# Patient Record
Sex: MALE | Race: ASIAN | Employment: OTHER | URBAN - METROPOLITAN AREA
[De-identification: names, ages, dates, MRNs, and addresses within clinical notes are randomized per-mention and may not be internally consistent; named-entity substitution may affect disease eponyms.]

---

## 2018-05-10 ENCOUNTER — HOSPITAL ENCOUNTER (OUTPATIENT)
Dept: PREADMISSION TESTING | Age: 58
Discharge: HOME | End: 2018-05-10
Payer: COMMERCIAL

## 2018-05-10 VITALS — WEIGHT: 160 LBS | BODY MASS INDEX: 24.25 KG/M2 | HEIGHT: 68 IN

## 2018-05-10 LAB
ANION GAP SERPL CALC-SCNC: 9 MMOL/L (ref 3–18)
BASOPHILS # BLD: 0 K/UL (ref 0–0.06)
BASOPHILS NFR BLD: 0 % (ref 0–2)
BUN SERPL-MCNC: 14 MG/DL (ref 7–18)
BUN/CREAT SERPL: 22 (ref 12–20)
CALCIUM SERPL-MCNC: 8.8 MG/DL (ref 8.5–10.1)
CHLORIDE SERPL-SCNC: 104 MMOL/L (ref 100–108)
CO2 SERPL-SCNC: 27 MMOL/L (ref 21–32)
CREAT SERPL-MCNC: 0.65 MG/DL (ref 0.6–1.3)
DIFFERENTIAL METHOD BLD: ABNORMAL
EOSINOPHIL # BLD: 0.1 K/UL (ref 0–0.4)
EOSINOPHIL NFR BLD: 3 % (ref 0–5)
ERYTHROCYTE [DISTWIDTH] IN BLOOD BY AUTOMATED COUNT: 12.4 % (ref 11.6–14.5)
EST. AVERAGE GLUCOSE BLD GHB EST-MCNC: 137 MG/DL
GLUCOSE SERPL-MCNC: 178 MG/DL (ref 74–99)
HBA1C MFR BLD: 6.4 % (ref 4.5–5.6)
HCT VFR BLD AUTO: 39.5 % (ref 36–48)
HGB BLD-MCNC: 13.1 G/DL (ref 13–16)
LYMPHOCYTES # BLD: 1.6 K/UL (ref 0.9–3.6)
LYMPHOCYTES NFR BLD: 29 % (ref 21–52)
MCH RBC QN AUTO: 30.2 PG (ref 24–34)
MCHC RBC AUTO-ENTMCNC: 33.2 G/DL (ref 31–37)
MCV RBC AUTO: 91 FL (ref 74–97)
MONOCYTES # BLD: 0.2 K/UL (ref 0.05–1.2)
MONOCYTES NFR BLD: 4 % (ref 3–10)
NEUTS SEG # BLD: 3.5 K/UL (ref 1.8–8)
NEUTS SEG NFR BLD: 64 % (ref 40–73)
PLATELET # BLD AUTO: 249 K/UL (ref 135–420)
PMV BLD AUTO: 9.3 FL (ref 9.2–11.8)
POTASSIUM SERPL-SCNC: 3.8 MMOL/L (ref 3.5–5.5)
RBC # BLD AUTO: 4.34 M/UL (ref 4.7–5.5)
SODIUM SERPL-SCNC: 140 MMOL/L (ref 136–145)
WBC # BLD AUTO: 5.5 K/UL (ref 4.6–13.2)

## 2018-05-10 PROCEDURE — 80048 BASIC METABOLIC PNL TOTAL CA: CPT | Performed by: UROLOGY

## 2018-05-10 PROCEDURE — 36415 COLL VENOUS BLD VENIPUNCTURE: CPT | Performed by: UROLOGY

## 2018-05-10 PROCEDURE — 83036 HEMOGLOBIN GLYCOSYLATED A1C: CPT | Performed by: UROLOGY

## 2018-05-10 PROCEDURE — 93005 ELECTROCARDIOGRAM TRACING: CPT

## 2018-05-10 PROCEDURE — 85025 COMPLETE CBC W/AUTO DIFF WBC: CPT | Performed by: UROLOGY

## 2018-05-10 RX ORDER — SIMVASTATIN 20 MG/1
20 TABLET, FILM COATED ORAL
COMMUNITY

## 2018-05-10 RX ORDER — TAMSULOSIN HYDROCHLORIDE 0.4 MG/1
0.4 CAPSULE ORAL DAILY
COMMUNITY

## 2018-05-10 RX ORDER — METFORMIN HYDROCHLORIDE 1000 MG/1
1000 TABLET ORAL 2 TIMES DAILY WITH MEALS
COMMUNITY

## 2018-05-10 RX ORDER — CEFAZOLIN SODIUM/WATER 2 G/20 ML
2 SYRINGE (ML) INTRAVENOUS ONCE
Status: CANCELLED | OUTPATIENT
Start: 2018-05-10 | End: 2018-05-10

## 2018-05-10 RX ORDER — SODIUM CHLORIDE, SODIUM LACTATE, POTASSIUM CHLORIDE, CALCIUM CHLORIDE 600; 310; 30; 20 MG/100ML; MG/100ML; MG/100ML; MG/100ML
125 INJECTION, SOLUTION INTRAVENOUS CONTINUOUS
Status: CANCELLED | OUTPATIENT
Start: 2018-05-10

## 2018-05-10 NOTE — PERIOP NOTES
Interpretor #[id number] used for Pat assessment and patient has good understanding that an interpretor is available the day of the surgery as well.

## 2018-05-11 LAB
ATRIAL RATE: 66 BPM
CALCULATED P AXIS, ECG09: 71 DEGREES
CALCULATED R AXIS, ECG10: 29 DEGREES
CALCULATED T AXIS, ECG11: 64 DEGREES
DIAGNOSIS, 93000: NORMAL
P-R INTERVAL, ECG05: 198 MS
Q-T INTERVAL, ECG07: 386 MS
QRS DURATION, ECG06: 92 MS
QTC CALCULATION (BEZET), ECG08: 404 MS
VENTRICULAR RATE, ECG03: 66 BPM

## 2018-05-15 ENCOUNTER — ANESTHESIA EVENT (OUTPATIENT)
Dept: SURGERY | Age: 58
End: 2018-05-15
Payer: COMMERCIAL

## 2018-05-15 ENCOUNTER — APPOINTMENT (OUTPATIENT)
Dept: GENERAL RADIOLOGY | Age: 58
End: 2018-05-15
Attending: UROLOGY
Payer: COMMERCIAL

## 2018-05-15 ENCOUNTER — HOSPITAL ENCOUNTER (OUTPATIENT)
Age: 58
Setting detail: OUTPATIENT SURGERY
Discharge: HOME | End: 2018-05-15
Attending: UROLOGY | Admitting: UROLOGY
Payer: COMMERCIAL

## 2018-05-15 ENCOUNTER — ANESTHESIA (OUTPATIENT)
Dept: SURGERY | Age: 58
End: 2018-05-15
Payer: COMMERCIAL

## 2018-05-15 VITALS
SYSTOLIC BLOOD PRESSURE: 146 MMHG | TEMPERATURE: 97.1 F | WEIGHT: 148.56 LBS | HEIGHT: 68 IN | DIASTOLIC BLOOD PRESSURE: 80 MMHG | OXYGEN SATURATION: 98 % | RESPIRATION RATE: 18 BRPM | BODY MASS INDEX: 22.52 KG/M2 | HEART RATE: 66 BPM

## 2018-05-15 DIAGNOSIS — N20.1 URETERAL STONE: Primary | ICD-10-CM

## 2018-05-15 LAB
GLUCOSE BLD STRIP.AUTO-MCNC: 107 MG/DL (ref 70–110)
GLUCOSE BLD STRIP.AUTO-MCNC: 134 MG/DL (ref 70–110)

## 2018-05-15 PROCEDURE — C1894 INTRO/SHEATH, NON-LASER: HCPCS | Performed by: UROLOGY

## 2018-05-15 PROCEDURE — 74011250637 HC RX REV CODE- 250/637: Performed by: ANESTHESIOLOGY

## 2018-05-15 PROCEDURE — 74011250636 HC RX REV CODE- 250/636: Performed by: ANESTHESIOLOGY

## 2018-05-15 PROCEDURE — 76010000149 HC OR TIME 1 TO 1.5 HR: Performed by: UROLOGY

## 2018-05-15 PROCEDURE — 77030010103 HC SEAL PRT ENDOSC OCOA -B: Performed by: UROLOGY

## 2018-05-15 PROCEDURE — 74011000250 HC RX REV CODE- 250

## 2018-05-15 PROCEDURE — 74011250636 HC RX REV CODE- 250/636

## 2018-05-15 PROCEDURE — 82962 GLUCOSE BLOOD TEST: CPT

## 2018-05-15 PROCEDURE — C1758 CATHETER, URETERAL: HCPCS | Performed by: UROLOGY

## 2018-05-15 PROCEDURE — 77030005526 HC CATH URETH FOL 2W COVD –A: Performed by: UROLOGY

## 2018-05-15 PROCEDURE — C2617 STENT, NON-COR, TEM W/O DEL: HCPCS | Performed by: UROLOGY

## 2018-05-15 PROCEDURE — 74011250636 HC RX REV CODE- 250/636: Performed by: UROLOGY

## 2018-05-15 PROCEDURE — 74420 UROGRAPHY RTRGR +-KUB: CPT

## 2018-05-15 PROCEDURE — 76060000033 HC ANESTHESIA 1 TO 1.5 HR: Performed by: UROLOGY

## 2018-05-15 PROCEDURE — 76210000006 HC OR PH I REC 0.5 TO 1 HR: Performed by: UROLOGY

## 2018-05-15 PROCEDURE — 82360 CALCULUS ASSAY QUANT: CPT | Performed by: UROLOGY

## 2018-05-15 PROCEDURE — 77030006974 HC BSKT URET RTVR BSC -C: Performed by: UROLOGY

## 2018-05-15 PROCEDURE — 77030012508 HC MSK AIRWY LMA AMBU -A: Performed by: ANESTHESIOLOGY

## 2018-05-15 PROCEDURE — 77030014023 HC SYR INFL LVEEN BSC -B: Performed by: UROLOGY

## 2018-05-15 PROCEDURE — 76210000021 HC REC RM PH II 0.5 TO 1 HR: Performed by: UROLOGY

## 2018-05-15 PROCEDURE — 74011636320 HC RX REV CODE- 636/320: Performed by: UROLOGY

## 2018-05-15 PROCEDURE — 77030020782 HC GWN BAIR PAWS FLX 3M -B: Performed by: UROLOGY

## 2018-05-15 PROCEDURE — 74011000272 HC RX REV CODE- 272: Performed by: UROLOGY

## 2018-05-15 PROCEDURE — 77030018836 HC SOL IRR NACL ICUM -A: Performed by: UROLOGY

## 2018-05-15 DEVICE — URETERAL STENT
Type: IMPLANTABLE DEVICE | Site: URETER | Status: FUNCTIONAL
Brand: POLARIS™ ULTRA

## 2018-05-15 RX ORDER — MAGNESIUM SULFATE 100 %
4 CRYSTALS MISCELLANEOUS AS NEEDED
Status: DISCONTINUED | OUTPATIENT
Start: 2018-05-15 | End: 2018-05-15 | Stop reason: HOSPADM

## 2018-05-15 RX ORDER — OXYCODONE HYDROCHLORIDE 5 MG/1
5 TABLET ORAL ONCE
Status: COMPLETED | OUTPATIENT
Start: 2018-05-15 | End: 2018-05-15

## 2018-05-15 RX ORDER — ALBUTEROL SULFATE 0.83 MG/ML
2.5 SOLUTION RESPIRATORY (INHALATION) AS NEEDED
Status: DISCONTINUED | OUTPATIENT
Start: 2018-05-15 | End: 2018-05-15 | Stop reason: HOSPADM

## 2018-05-15 RX ORDER — DEXTROSE 50 % IN WATER (D50W) INTRAVENOUS SYRINGE
25-50 AS NEEDED
Status: DISCONTINUED | OUTPATIENT
Start: 2018-05-15 | End: 2018-05-15 | Stop reason: HOSPADM

## 2018-05-15 RX ORDER — DIPHENHYDRAMINE HYDROCHLORIDE 50 MG/ML
12.5 INJECTION, SOLUTION INTRAMUSCULAR; INTRAVENOUS
Status: DISCONTINUED | OUTPATIENT
Start: 2018-05-15 | End: 2018-05-15 | Stop reason: HOSPADM

## 2018-05-15 RX ORDER — ONDANSETRON 2 MG/ML
INJECTION INTRAMUSCULAR; INTRAVENOUS AS NEEDED
Status: DISCONTINUED | OUTPATIENT
Start: 2018-05-15 | End: 2018-05-15 | Stop reason: HOSPADM

## 2018-05-15 RX ORDER — LIDOCAINE HYDROCHLORIDE 20 MG/ML
INJECTION, SOLUTION EPIDURAL; INFILTRATION; INTRACAUDAL; PERINEURAL AS NEEDED
Status: DISCONTINUED | OUTPATIENT
Start: 2018-05-15 | End: 2018-05-15 | Stop reason: HOSPADM

## 2018-05-15 RX ORDER — SODIUM CHLORIDE, SODIUM LACTATE, POTASSIUM CHLORIDE, CALCIUM CHLORIDE 600; 310; 30; 20 MG/100ML; MG/100ML; MG/100ML; MG/100ML
150 INJECTION, SOLUTION INTRAVENOUS CONTINUOUS
Status: DISCONTINUED | OUTPATIENT
Start: 2018-05-15 | End: 2018-05-15 | Stop reason: HOSPADM

## 2018-05-15 RX ORDER — OXYCODONE AND ACETAMINOPHEN 5; 325 MG/1; MG/1
1 TABLET ORAL
Qty: 25 TAB | Refills: 0 | Status: SHIPPED | OUTPATIENT
Start: 2018-05-15 | End: 2018-06-05

## 2018-05-15 RX ORDER — INSULIN LISPRO 100 [IU]/ML
INJECTION, SOLUTION INTRAVENOUS; SUBCUTANEOUS ONCE
Status: DISCONTINUED | OUTPATIENT
Start: 2018-05-15 | End: 2018-05-15 | Stop reason: HOSPADM

## 2018-05-15 RX ORDER — SODIUM CHLORIDE 0.9 % (FLUSH) 0.9 %
5-10 SYRINGE (ML) INJECTION AS NEEDED
Status: DISCONTINUED | OUTPATIENT
Start: 2018-05-15 | End: 2018-05-15 | Stop reason: HOSPADM

## 2018-05-15 RX ORDER — CEPHALEXIN 500 MG/1
500 CAPSULE ORAL 3 TIMES DAILY
Qty: 15 CAP | Refills: 0 | Status: SHIPPED | OUTPATIENT
Start: 2018-05-15 | End: 2018-05-20

## 2018-05-15 RX ORDER — NALOXONE HYDROCHLORIDE 0.4 MG/ML
0.1 INJECTION, SOLUTION INTRAMUSCULAR; INTRAVENOUS; SUBCUTANEOUS AS NEEDED
Status: DISCONTINUED | OUTPATIENT
Start: 2018-05-15 | End: 2018-05-15 | Stop reason: HOSPADM

## 2018-05-15 RX ORDER — METOCLOPRAMIDE HYDROCHLORIDE 5 MG/ML
INJECTION INTRAMUSCULAR; INTRAVENOUS AS NEEDED
Status: DISCONTINUED | OUTPATIENT
Start: 2018-05-15 | End: 2018-05-15 | Stop reason: HOSPADM

## 2018-05-15 RX ORDER — ONDANSETRON 2 MG/ML
4 INJECTION INTRAMUSCULAR; INTRAVENOUS ONCE
Status: DISCONTINUED | OUTPATIENT
Start: 2018-05-15 | End: 2018-05-15 | Stop reason: HOSPADM

## 2018-05-15 RX ORDER — SODIUM CHLORIDE, SODIUM LACTATE, POTASSIUM CHLORIDE, CALCIUM CHLORIDE 600; 310; 30; 20 MG/100ML; MG/100ML; MG/100ML; MG/100ML
125 INJECTION, SOLUTION INTRAVENOUS CONTINUOUS
Status: DISCONTINUED | OUTPATIENT
Start: 2018-05-15 | End: 2018-05-15 | Stop reason: HOSPADM

## 2018-05-15 RX ORDER — FENTANYL CITRATE 50 UG/ML
INJECTION, SOLUTION INTRAMUSCULAR; INTRAVENOUS AS NEEDED
Status: DISCONTINUED | OUTPATIENT
Start: 2018-05-15 | End: 2018-05-15 | Stop reason: HOSPADM

## 2018-05-15 RX ORDER — CEFAZOLIN SODIUM/WATER 2 G/20 ML
2 SYRINGE (ML) INTRAVENOUS ONCE
Status: COMPLETED | OUTPATIENT
Start: 2018-05-15 | End: 2018-05-15

## 2018-05-15 RX ORDER — FENTANYL CITRATE 50 UG/ML
25 INJECTION, SOLUTION INTRAMUSCULAR; INTRAVENOUS AS NEEDED
Status: DISCONTINUED | OUTPATIENT
Start: 2018-05-15 | End: 2018-05-15 | Stop reason: HOSPADM

## 2018-05-15 RX ORDER — EPHEDRINE SULFATE/0.9% NACL/PF 25 MG/5 ML
SYRINGE (ML) INTRAVENOUS AS NEEDED
Status: DISCONTINUED | OUTPATIENT
Start: 2018-05-15 | End: 2018-05-15 | Stop reason: HOSPADM

## 2018-05-15 RX ORDER — PROPOFOL 10 MG/ML
INJECTION, EMULSION INTRAVENOUS AS NEEDED
Status: DISCONTINUED | OUTPATIENT
Start: 2018-05-15 | End: 2018-05-15 | Stop reason: HOSPADM

## 2018-05-15 RX ORDER — MIDAZOLAM HYDROCHLORIDE 1 MG/ML
INJECTION, SOLUTION INTRAMUSCULAR; INTRAVENOUS AS NEEDED
Status: DISCONTINUED | OUTPATIENT
Start: 2018-05-15 | End: 2018-05-15 | Stop reason: HOSPADM

## 2018-05-15 RX ORDER — HYDROCODONE BITARTRATE AND ACETAMINOPHEN 5; 325 MG/1; MG/1
1 TABLET ORAL AS NEEDED
Status: DISCONTINUED | OUTPATIENT
Start: 2018-05-15 | End: 2018-05-15 | Stop reason: HOSPADM

## 2018-05-15 RX ADMIN — FENTANYL CITRATE 25 MCG: 50 INJECTION, SOLUTION INTRAMUSCULAR; INTRAVENOUS at 13:30

## 2018-05-15 RX ADMIN — FENTANYL CITRATE 25 MCG: 50 INJECTION, SOLUTION INTRAMUSCULAR; INTRAVENOUS at 12:38

## 2018-05-15 RX ADMIN — ONDANSETRON 4 MG: 2 INJECTION INTRAMUSCULAR; INTRAVENOUS at 12:34

## 2018-05-15 RX ADMIN — METOCLOPRAMIDE HYDROCHLORIDE 10 MG: 5 INJECTION INTRAMUSCULAR; INTRAVENOUS at 12:34

## 2018-05-15 RX ADMIN — MIDAZOLAM HYDROCHLORIDE 2 MG: 1 INJECTION, SOLUTION INTRAMUSCULAR; INTRAVENOUS at 12:25

## 2018-05-15 RX ADMIN — PROPOFOL 200 MG: 10 INJECTION, EMULSION INTRAVENOUS at 12:29

## 2018-05-15 RX ADMIN — SODIUM CHLORIDE, SODIUM LACTATE, POTASSIUM CHLORIDE, AND CALCIUM CHLORIDE: 600; 310; 30; 20 INJECTION, SOLUTION INTRAVENOUS at 12:29

## 2018-05-15 RX ADMIN — SODIUM CHLORIDE, SODIUM LACTATE, POTASSIUM CHLORIDE, AND CALCIUM CHLORIDE: 600; 310; 30; 20 INJECTION, SOLUTION INTRAVENOUS at 13:39

## 2018-05-15 RX ADMIN — FENTANYL CITRATE 50 MCG: 50 INJECTION, SOLUTION INTRAMUSCULAR; INTRAVENOUS at 12:59

## 2018-05-15 RX ADMIN — SODIUM CHLORIDE, SODIUM LACTATE, POTASSIUM CHLORIDE, AND CALCIUM CHLORIDE 125 ML/HR: 600; 310; 30; 20 INJECTION, SOLUTION INTRAVENOUS at 10:16

## 2018-05-15 RX ADMIN — Medication 2 G: at 12:29

## 2018-05-15 RX ADMIN — FENTANYL CITRATE 25 MCG: 50 INJECTION, SOLUTION INTRAMUSCULAR; INTRAVENOUS at 14:26

## 2018-05-15 RX ADMIN — Medication 10 MG: at 12:54

## 2018-05-15 RX ADMIN — OXYCODONE HYDROCHLORIDE 5 MG: 5 TABLET ORAL at 15:39

## 2018-05-15 RX ADMIN — FENTANYL CITRATE 25 MCG: 50 INJECTION, SOLUTION INTRAMUSCULAR; INTRAVENOUS at 14:36

## 2018-05-15 RX ADMIN — LIDOCAINE HYDROCHLORIDE 100 MG: 20 INJECTION, SOLUTION EPIDURAL; INFILTRATION; INTRACAUDAL; PERINEURAL at 12:29

## 2018-05-15 NOTE — INTERVAL H&P NOTE
H&P Update: Rolanda Mobley was seen and examined. History and physical has been reviewed. The patient has been examined.  There have been no significant clinical changes since the completion of the originally dated History and Physical.    Signed By: Marcie Alvarado MD     May 15, 2018 12:23 PM

## 2018-05-15 NOTE — ANESTHESIA PREPROCEDURE EVALUATION
Anesthetic History   No history of anesthetic complications            Review of Systems / Medical History  Patient summary reviewed, nursing notes reviewed and pertinent labs reviewed    Pulmonary  Within defined limits                 Neuro/Psych   Within defined limits           Cardiovascular                  Exercise tolerance: >4 METS     GI/Hepatic/Renal  Within defined limits              Endo/Other    Diabetes: well controlled         Other Findings              Physical Exam    Airway  Mallampati: II  TM Distance: 4 - 6 cm  Neck ROM: normal range of motion, short neck   Mouth opening: Normal     Cardiovascular  Regular rate and rhythm,  S1 and S2 normal,  no murmur, click, rub, or gallop             Dental  No notable dental hx       Pulmonary  Breath sounds clear to auscultation               Abdominal  GI exam deferred       Other Findings            Anesthetic Plan    ASA: 2  Anesthesia type: general          Induction: Intravenous  Anesthetic plan and risks discussed with: Patient

## 2018-05-15 NOTE — ANESTHESIA POSTPROCEDURE EVALUATION
Post-Anesthesia Evaluation and Assessment    Patient: Shubham Marcus MRN: [id number]  SSN: [id number]    YOB: 1960  Age: 59 y.o. Sex: male       Cardiovascular Function/Vital Signs  Visit Vitals    /79    Pulse 66    Temp 37.5 °C (99.5 °F)    Resp 19    Ht 5' 8\" (1.727 m)    Wt 67.4 kg (148 lb 9 oz)    SpO2 99%    BMI 22.59 kg/m2       Patient is status post general anesthesia for Procedure(s):  CYSTOSCOPY, RIGHT RETROGRADE PYELOGRAM WITH INTERPRETATION, RIGHT URETEROSCOPY, LASER LITHOTRIPSY W/HOLMIUM, STENT PLACEMENT W/C-ARM. Nausea/Vomiting: None    Postoperative hydration reviewed and adequate. Pain:  Pain Scale 1: Visual (05/15/18 1435)  Pain Intensity 1: 1 (05/15/18 1435)   Managed    Neurological Status:   Neuro (WDL): Within Defined Limits (05/15/18 1435)  Neuro  Neurologic State: Drowsy (05/15/18 1435)  LUE Motor Response: Purposeful (05/15/18 1355)  LLE Motor Response: Purposeful (05/15/18 1355)  RUE Motor Response: Purposeful (05/15/18 1355)  RLE Motor Response: Purposeful (05/15/18 1355)   At baseline    Mental Status and Level of Consciousness: Arousable    Pulmonary Status:   O2 Device: Room air (05/15/18 1435)   Adequate oxygenation and airway patent    Complications related to anesthesia: None    Post-anesthesia assessment completed.  No concerns      Signed By: Kasey Carvajal CRNA     May 15, 2018

## 2018-05-15 NOTE — H&P
A    Urology [address], [facility] [employer], [address] [place] [address]  phone: [phone]  fax: [phone]          Patient: Layton Mejias  YOB: 1960  Date: 05/04/2018 9:30 AM   Visit Type: Office Visit      Assessment/Plan  # Detail Type Description    1. Assessment Gross hematuria (R31.0). Patient Plan His hematuria is undoubtedly due to his stone. Obviously I will eval with cysto at the time of his procedure. 2. Assessment Ureteric stone (N20.1). Patient Plan He has a right ureteral stone. It's large and he has a hx of dense calcium oxalate monohydrate stones. We will do a left ureteroscopy b/c ESWL is less effective for the dense stones. Risk of bleeding, infection, injury and possible need for more than 1 procedure were discussed. He was told he would need a stent afterwards undoubtedly for at least 1wk. He will proceed. This 59year old  patient was referred by Reese Sears. This 59year old male presents for Kidney and/or Ureteral Stone. History of Present Illness:  1. Kidney and/or Ureteral Stone      Onset was 1 week ago. Severity level is mild. It occurs intermittently. The problem is improving. The pain does not radiate. Prior stone type of calcium oxalate monohydrate. There are no aggravating factors. The patient lists no relieving factors. Associated symptoms include hematuria (gross) and pain. Pertinent negatives include chills, constipation, diarrhea, fever, nausea, pelvic pain and vomiting. Additional information: 5/1/18 KUB: 4x11 stone at right UPJ    5/4/18 -- He currently feels well. Serena Rodriguez PAST MEDICAL/SURGICAL HISTORY   (Reviewed, updated)      DIAGNOSTICS HISTORY:  Test Ordered Interpretation Result completed   EKG 08/05/2014 See Result Normal sinus rhythm. No abnormal findings noted. Rhythm: sinus.  Rate: 61. AV Conduction: normal. QRS Axis and Voltage: normal. Chamber Hypertrophy/Enlargement: normal. ST/T wave abnormality: normal. 2014   *EYE EXAM & TREATMENT  No DM retinopathy  2018     Test Ordered Ordering Comments Modifier   EKG 2014     *EYE EXAM & TREATMENT          PROBLEM LIST:  Problem Description Onset Date   Pinguecula 2012   Disorder of refraction AND/OR accommodation 2012   Hematuria, gross    Type II diabetes mellitus w/o complication    Pure hypercholesterolemia 2012   Hypercholesterolemia 2015   Type 2 diabetes mellitus well controlled 2015     Medication Reconciliation  Medications reconciled today. Medication Reviewed  Adherence Medication Name Sig Desc Elsewhere Status   taking as directed Flomax 0.4 mg capsule take 1 capsule by oral route  every day 1/2 hour following the same meal each day N Verified   taking as directed metformin  mg tablet,extended release 24hr take 2 tablet by oral route 2 times every day with the morning and evening meal N Verified   taking as directed Zocor 20 mg tablet take 1 tablet by oral route  every day in the evening N Verified     Allergies  Ingredient Reaction Medication Name Comment   NO KNOWN ALLERGIES        (Reviewed, no changes.)  Family History:  (Reviewed, updated)  Relationship Family Member Name  Age at Death Condition Onset Age Cause of Death   Brother    Diabetes mellitus type 2 58 N   Mother    DM, Chol  N   Sister    DM 59 N         Social History:  (Reviewed, updated)  Tobacco use reviewed. The patient is right-handed. Preferred language is Naqvi. EDUCATION/EMPLOYMENT/OCCUPATION  Employment History Status Retired Restrictions   [employer]        MARITAL STATUS/FAMILY/SOCIAL SUPPORT  Currently . CHILDREN  Has children:  2 son(s). Tobacco use status: Never smoked tobacco.  Smoking status: Never smoker.     SMOKING STATUS  Use Status Type Smoking Status Usage Per Day Years Used Total Pack Years   no/never  Never smoker          No passive smoke exposure. ALCOHOL  There is a history of alcohol use. Type: Driss Guevara. 4 drinks consumed weekly. Last alcoholic drink was last week. CAFFEINE  The patient uses caffeine: coffee. LIFESTYLE  Moderate activity level. Exercises daily. DIET  , high cholesterol. SLEEP PATTERNS  Patient has no changes to sleep patterns. HOME ENVIRONMENT/SAFETY  The home has smoke detectors. Uses seat belts. Review of Systems  System Neg/Pos Details   Constitutional Negative Chills and fever. ENMT Negative Ear infections and sore throat. Eyes Negative Blurred vision, double vision and eye pain. Respiratory Negative Asthma, chronic cough, dyspnea and wheezing. Cardio Negative Chest pain. GI Negative Constipation, decreased appetite, diarrhea, nausea and vomiting.  Positive Hematuria. Endocrine Negative Cold intolerance, heat intolerance, increased thirst and weight loss. Neuro Negative Headache and tremors. Psych Negative Anxiety and depression. Integumentary Negative Itching skin and rash. MS Negative Back pain and joint pain. Hema/Lymph Negative Easy bleeding. Reproductive Negative Sexual dysfunction. Physical Exam  Exam Findings Details   Constitutional Normal Well developed. Neck Exam Normal Inspection - Normal.   Respiratory Normal Inspection - Normal.   Abdomen Normal No abdominal tenderness. Genitourinary Normal No CVA tenderness. No suprapubic tenderness. Extremity Normal No edema. Psychiatric Normal Oriented to time, place, person and situation. Appropriate mood and affect.          Medications (added, continued, or stopped today)  Started Medication Directions PRN Status PRN Reason Instruction Stopped   11/03/2017 Flomax 0.4 mg capsule take 1 capsule by oral route  every day 1/2 hour following the same meal each day N      04/12/2018 metformin  mg tablet,extended release 24hr take 2 tablet by oral route 2 times every day with the morning and evening meal N      04/12/2018 Zocor 20 mg tablet take 1 tablet by oral route  every day in the evening N      Completed by:            June Wild MD

## 2018-05-15 NOTE — DISCHARGE INSTRUCTIONS
Drink plenty of fluids to keep the urine clear  Regular diet  Take prescriptions as directed  Shower tomorrow  Ambulate multiple times daily  Dr. Saniya Brito office will call with follow up appointment  Contact Dr. Saniya Brito office with any Post op questions or concerns at [address] from Nurse    PATIENT INSTRUCTIONS:    After general anesthesia or intravenous sedation, for 24 hours or while taking prescription Narcotics:  · Limit your activities  · Do not drive and operate hazardous machinery  · Do not make important personal or business decisions  · Do  not drink alcoholic beverages  · If you have not urinated within 8 hours after discharge, please contact your surgeon on call. Report the following to your surgeon:  · Excessive pain, swelling, redness or odor of or around the surgical area  · Temperature over 100.5  · Nausea and vomiting lasting longer than 4 hours or if unable to take medications  · Any signs of decreased circulation or nerve impairment to extremity: change in color, persistent  numbness, tingling, coldness or increase pain  · Any questions    What to do at Home:  Recommended activity: Ambulate in house and No driving while on analgesics,     If you experience any of the following symptoms fever, chills, uncontrollable pain, active bleeding- thick urine with clots, difficulty urinating, please follow up with Dr. Kate Gunter. *  Please give a list of your current medications to your Primary Care Provider. *  Please update this list whenever your medications are discontinued, doses are      changed, or new medications (including over-the-counter products) are added. *  Please carry medication information at all times in case of emergency situations.     These are general instructions for a healthy lifestyle:    No smoking/ No tobacco products/ Avoid exposure to second hand smoke  Surgeon General's Warning:  Quitting smoking now greatly reduces serious risk to your health. Obesity, smoking, and sedentary lifestyle greatly increases your risk for illness    A healthy diet, regular physical exercise & weight monitoring are important for maintaining a healthy lifestyle    You may be retaining fluid if you have a history of heart failure or if you experience any of the following symptoms:  Weight gain of 3 pounds or more overnight or 5 pounds in a week, increased swelling in our hands or feet or shortness of breath while lying flat in bed. Please call your doctor as soon as you notice any of these symptoms; do not wait until your next office visit. Recognize signs and symptoms of STROKE:    F-face looks uneven    A-arms unable to move or move unevenly    S-speech slurred or non-existent    T-time-call 911 as soon as signs and symptoms begin-DO NOT go       Back to bed or wait to see if you get better-TIME IS BRAIN. Warning Signs of HEART ATTACK     Call 911 if you have these symptoms:   Chest discomfort. Most heart attacks involve discomfort in the center of the chest that lasts more than a few minutes, or that goes away and comes back. It can feel like uncomfortable pressure, squeezing, fullness, or pain.  Discomfort in other areas of the upper body. Symptoms can include pain or discomfort in one or both arms, the back, neck, jaw, or stomach.  Shortness of breath with or without chest discomfort.  Other signs may include breaking out in a cold sweat, nausea, or lightheadedness. Don't wait more than five minutes to call 911 - MINUTES MATTER! Fast action can save your life. Calling 911 is almost always the fastest way to get lifesaving treatment. Emergency Medical Services staff can begin treatment when they arrive -- up to an hour sooner than if someone gets to the hospital by car. Patient armband removed and shredded    The discharge information has been reviewed with the patient and caregiver. The patient and caregiver verbalized understanding.   Discharge medications reviewed with the patient and caregiver and appropriate educational materials and side effects teaching were provided.   ___________________________________________________________________________________________________________________________________

## 2018-05-15 NOTE — PERIOP NOTES
TRANSFER - OUT REPORT:    Verbal report given to CARISA Bourne (name) on Cadence Kelly  being transferred to phase 2(unit) for routine post - op       Report consisted of patients Situation, Background, Assessment and   Recommendations(SBAR). Information from the following report(s) SBAR, Intake/Output and MAR was reviewed with the receiving nurse. Lines:   Peripheral IV 05/15/18 Left Hand (Active)   Site Assessment Clean, dry, & intact 5/15/2018  2:18 PM   Phlebitis Assessment 0 5/15/2018  2:18 PM   Infiltration Assessment 0 5/15/2018  2:18 PM   Dressing Status Clean, dry, & intact 5/15/2018  2:18 PM   Dressing Type Transparent;Tape 5/15/2018  2:18 PM   Hub Color/Line Status Infusing 5/15/2018  2:18 PM        Opportunity for questions and clarification was provided.       Patient transported with: CNA

## 2018-05-15 NOTE — IP AVS SNAPSHOT
[address] [address] 
[phone] Patient: Betina Moody MRN: [id number] ELJ:60/2/1903 About your hospitalization You were admitted on:  May 15, 2018 You last received care in the:  [facility] PACU You were discharged on:  May 15, 2018 Why you were hospitalized Your primary diagnosis was:  Ureteral Stone Follow-up Information Follow up With Details Comments Contact Info Janice Damon. [phone]  [address] [facility] 
[phone] Ailyn Fraser MD  office will call with follow up appointment [address] [facility] [address] [facility] 
[phone] Discharge Orders None A check christina indicates which time of day the medication should be taken. My Medications START taking these medications Instructions Each Dose to Equal  
 Morning Noon Evening Bedtime  
 cephALEXin 500 mg capsule Commonly known as:  Earmon Gravely Your last dose was: Your next dose is: Take 1 Cap by mouth three (3) times daily for 5 days. 500 mg  
    
   
   
   
  
 oxyCODONE-acetaminophen 5-325 mg per tablet Commonly known as:  PERCOCET Your last dose was: Your next dose is: Take 1 Tab by mouth every four (4) hours as needed for Pain. Max Daily Amount: 6 Tabs. 1 Tab CONTINUE taking these medications Instructions Each Dose to Equal  
 Morning Noon Evening Bedtime  
 metFORMIN 1,000 mg tablet Commonly known as:  GLUCOPHAGE Your last dose was: Your next dose is: Take 1,000 mg by mouth two (2) times daily (with meals). 1000 mg  
    
   
   
   
  
 simvastatin 20 mg tablet Commonly known as:  ZOCOR Your last dose was: Your next dose is: Take 20 mg by mouth nightly. 20 mg  
    
   
   
   
  
 tamsulosin 0.4 mg capsule Commonly known as:  FLOMAX Your last dose was: Your next dose is: Take 0.4 mg by mouth daily. 0.4 mg Where to Get Your Medications Information on where to get these meds will be given to you by the nurse or doctor. ! Ask your nurse or doctor about these medications  
  cephALEXin 500 mg capsule  
 oxyCODONE-acetaminophen 5-325 mg per tablet Opioid Education Prescription Opioids: What You Need to Know: 
 
 
 
F-face looks uneven A-arms unable to move or move unevenly S-speech slurred or non-existent T-time-call 911 as soon as signs and symptoms begin-DO NOT go Back to bed or wait to see if you get better-TIME IS BRAIN. Warning Signs of HEART ATTACK Call 911 if you have these symptoms: 
? Chest discomfort. Most heart attacks involve discomfort in the center of the chest that lasts more than a few minutes, or that goes away and comes back. It can feel like uncomfortable pressure, squeezing, fullness, or pain. ? Discomfort in other areas of the upper body.  Symptoms can include pain or discomfort in one or both arms, the back, neck, jaw, or stomach. ? Shortness of breath with or without chest discomfort. ? Other signs may include breaking out in a cold sweat, nausea, or lightheadedness. Don't wait more than five minutes to call [address]! Fast action can save your life. Calling 911 is almost always the fastest way to get lifesaving treatment. Emergency Medical Services staff can begin treatment when they arrive  up to an hour sooner than if someone gets to the hospital by car. Patient armband removed and shredded The discharge information has been reviewed with the patient and caregiver. The patient and caregiver verbalized understanding. Discharge medications reviewed with the patient and caregiver and appropriate educational materials and side effects teaching were provided. ___________________________________________________________________________________________________________________________________ Introducing \[facility] [place] [facility]\"" & HEALTH SERVICES! [facility] introduces [employer] patient portal. Now you can access parts of your medical record, email your doctor's office, and request medication refills online. 1. In your internet browser, go to [email or web address] 2. Click on the First Time User? Click Here link in the Sign In box. You will see the New Member Sign Up page. 3. Enter your [employer] Access Code exactly as it appears below. You will not need to use this code after youve completed the sign-up process. If you do not sign up before the expiration date, you must request a new code. · [employer] Access Code: [email or web address] Expires: 8/6/2018 12:33 PM 
 
4. Enter the last four digits of your Social Security Number (xxxx) and Date of Birth (mm/dd/yyyy) as indicated and click Submit. You will be taken to the next sign-up page. 5. Create a MyChart ID.  This will be your [employer] login ID and cannot be changed, so think of one that is secure and easy to remember. 6. Create a [employer] password. You can change your password at any time. 7. Enter your Password Reset Question and Answer. This can be used at a later time if you forget your password. 8. Enter your e-mail address. You will receive e-mail notification when new information is available in [address] 9. Click Sign Up. You can now view and download portions of your medical record. 10. Click the Download Summary menu link to download a portable copy of your medical information. If you have questions, please visit the Frequently Asked Questions section of the [employer] website. Remember, [employer] is NOT to be used for urgent needs. For medical emergencies, dial 911. Now available from your iPhone and Android! Introducing Vishal Arevalo As a [facility] Reusing patient, I wanted to make you aware of our electronic visit tool called Vishal Arevalo. [facility] Reusing 24/7 allows you to connect within minutes with a medical provider 24 hours a day, seven days a week via a mobile device or tablet or logging into a secure website from your computer. You can access Vishal Arevalo from anywhere in the [place]. A virtual visit might be right for you when you have a simple condition and feel like you just dont want to get out of bed, or cant get away from work for an appointment, when your regular [facility] Reusing provider is not available (evenings, weekends or holidays), or when youre out of town and need minor care. Electronic visits cost only $49 and if the [facility]using 24/7 provider determines a prescription is needed to treat your condition, one can be electronically transmitted to a nearby pharmacy*. Please take a moment to enroll today if you have not already done so. The enrollment process is free and takes just a few minutes.   To enroll, please download the [facility] Reusing 24/7 elizabeth to your tablet or phone, or visit [email or web address] to enroll on your computer. And, as an [address] [place] [address] patient with a [employer] account, the results of your visits will be scanned into your electronic medical record and your primary care provider will be able to view the scanned results. We urge you to continue to see your regular Diane Robledo provider for your ongoing medical care. And while your primary care provider may not be the one available when you seek a Vishal Doculynxsamirfin virtual visit, the peace of mind you get from getting a real diagnosis real time can be priceless. For more information on [employer], view our Frequently Asked Questions (FAQs) at [email or web address]. Sincerely, 
 
Surekha Ramirez MD 
Chief Medical Officer [address] *:  certain medications cannot be prescribed via [employer] Unresulted Labs-Please follow up with your PCP about these lab tests Order Current Status XR RETROGRADE PYELOGRAM In process Providers Seen During Your Hospitalization Provider Specialty Primary office phone Evgeny Diaz MD Urology [phone] Your Primary Care Physician (PCP) Primary Care Physician Office Phone Office Fax Colette Crow [phone] You are allergic to the following Allergen Reactions Other Food Hives Canned meat Recent Documentation Height Weight BMI Smoking Status 1.727 m 67.4 kg 22.59 kg/m2 Former Smoker Emergency Contacts Name Discharge Info Relation Home Work Mobile [facility] DISCHARGE CAREGIVER [3] Spouse [3]   [phone] Kyung Tellez N/A  AT THIS TIME [6] Other Relative [6]   [phone] [facility] DISCHARGE CAREGIVER [3] Son [22]   [phone] Patient Belongings  The following personal items are in your possession at time of discharge: 
  Dental Appliances: None         Home Medications: None   Jewelry: None Clothing: Pants, Shirt, Socks, Footwear, Undergarments (with Alanis)    Other Valuables: Eyeglasses Please provide this summary of care documentation to your next provider. Signatures-by signing, you are acknowledging that this After Visit Summary has been reviewed with you and you have received a copy. Patient Signature:  ____________________________________________________________ Date:  ____________________________________________________________  
  
Cassidy Johnson Provider Signature:  ____________________________________________________________ Date:  ____________________________________________________________

## 2018-05-15 NOTE — BRIEF OP NOTE
BRIEF OPERATIVE NOTE    Date of Procedure: 5/15/2018   Preoperative Diagnosis: URETERAL STONE  Postoperative Diagnosis: URETERAL STONE with hydro and kidney stone    Procedure(s):  CYSTOSCOPY, RIGHT RETROGRADE PYELOGRAM WITH INTERPRETATION, RIGHT URETEROSCOPY, LASER LITHOTRIPSY of URETERAL STONE.  LASER LITHOTRIPSY OF RENAL STONE W/HOLMIUM, STENT PLACEMENT   Surgeon(s) and Role:     * Olivia Hurley MD - Primary         Surgical Assistant: NONE    Surgical Staff:  Circ-1: Joann Waters  Scrub Tech-1: Fina Granados  Scrub Tech-2: Charles Cash  Event Time In   Incision Start 1243   Incision Close 1344     Anesthesia: General   Estimated Blood Loss: NONE  Specimens:   ID Type Source Tests Collected by Time Destination   1 : right kidney stones Other URETER, RIGHT  Olivia Hurley MD 5/15/2018 1317 Pathology      Findings: LARGE STONE IN MID URETER AND SMALL STONE IN MIDPOLEOF KIDNEY LASERED AND LARGEST FRAGMENTS REMOVED   Complications: NONE  Implants:   Implant Name Type Inv.  Item Serial No.  Lot No. LRB No. Used Action   STENT Charlie Veronica 5X24 -- [facility] - [id number]   Sherri BRICE 5X24 -- [facility] 19 [id number] Right 1 Implanted

## 2018-05-15 NOTE — PERIOP NOTES
Discharge instructions completed - translation via Spiritism member - verbalizes understanding - opportunity for questions

## 2018-05-25 LAB
CA PHOS MFR STONE: 3 %
CALCIUM OXALATE DIHYDRATE MFR STONE IR: 5 %
COLOR STONE: NORMAL
COM MFR STONE: 92 %
COMMENT, 519994: NORMAL
COMPOSITION, 120440: NORMAL
DISCLAIMER, STO32L: NORMAL
NIDUS STONE QL: NORMAL
SIZE STONE: NORMAL MM
WT STONE: 50.6 MG

## 2018-05-30 NOTE — OP NOTES
[address] REPORT    Katalina Mcdermott  MR#: [id number]  : 1960  ACCOUNT #: [de-identified]   DATE OF SERVICE: 05/15/2018    PREOPERATIVE DIAGNOSIS:  Ureteral stone. POSTOPERATIVE DIAGNOSES:  Ureteral stone with hydronephrosis and a kidney stone. PROCEDURES PERFORMED:  Cystoscopy, right retrograde pyelogram with interpretation, right ureteroscopy, laser lithotripsy of ureteral stone, laser lithotripsy of renal stone with holmium laser and stent placement. SURGEON:    Tara Serra MD    ASSISTANT:  None. ANESTHESIA:  General.    ESTIMATED BLOOD LOSS:  None. SPECIMENS REMOVED:  Kidney stone. FINDINGS:  The patient had a large stone in the mid ureter and a small stone in the mid pole of the kidney. Everything was lasered and the largest fragments removed. COMPLICATIONS:  None. IMPLANT:  A 5-Frisian 24 cm stent. CLINICAL NOTE:  The patient is a 77-year-old gentleman with a history of stones. He has had one month of intermittent right flank pain and presents for ureteroscopy. Preoperatively, risks and benefits of surgery were described to the patient, with the risks include but are not limited to bleeding, infection, injury to the bladder, injury to ureter, injury to the kidney, possible need for more than 1 procedure to remain stone free. The patient understood the risks and signed the informed consent. PROCEDURE:  The patient was taken to the operating room and placed on OR table in supine position. He was administered general anesthetic. He was administered intravenous antibiotics. He was placed in dorsal lithotomy position, prepped and draped in the usual sterile manner. A 21-Frisian cystoscope and sheath was then inserted transurethrally, atraumatically, under direct vision using a 30 degree lens. Panendoscopy was done. The bilateral ureteral orifices were in normal anatomic position. There were no stones, no tumors, no areas of concern.   The anterior urethra was normal.  The prostatic urethra showed mild enlargement. The right ureteral orifice was identified. Cannulated with a 5 Western Tracy open-ended ureteral catheter. Retrograde pyelogram was done in the usual manner, using 50 mL of Visipaque  dye. There was noted to be a filling defect in the proximal ureter. Proximal to this, there was hydroureteronephrosis. There was no blunting of calices. No other filling defects were seen along the length of the ureter, up into the kidney. Next, I passed a sensor wire through the open-ended catheter up to the kidney. The open-ended catheter was removed. I then removed the scope and the open-ended catheter. I then placed a dual lumen catheter, past a second sensor wire up to the kidney. The dual lumen catheter was removed. One wire was a safety wire and the other wire was a working wire. Over the working wire, I passed an 11/13/28 cm Navigator sheath over the wire, up into the mid ureter. The inner workings of the sheath and the working wire were removed. I then passed a flexible ureteroscope through the sheath, up the ureter. We encountered the stone in the proximal ureter, used a laser it to completion using a 200 micron holmium laser fiber. All the largest fragments were removed with a Zero Tip basket. I then advanced the scope up into the kidney. Pan pyeloscopy was done. There was noted to be a stone in the mid pole markel, that was lasered to completion and all fragments removed. There were no significant fragments by the end of the case, there were no other stones, no areas of concern, no tumors. Next, I removed the scope and the sheath without difficulty. As I slowly backed the scope down the ureter, it was noted that the ureter was intact and unharmed. Next, I reinserted the cystoscope. Over the safety wire, I passed a 5 Tajik 24 cm stent. The wire was removed. Fluoroscopy confirmed the proximal curl in the kidney.   The distal curl was noted to be in the bladder by direct vision. At this point, the patient taken out of lithotomy position, revived from anesthesia, and taken to recovery in stable condition.       Aundrea Villarreal MD       [facility] / Matheus Garcia  D: 05/30/2018 07:43     T: 05/30/2018 08:30  JOB #: [id number]

## 2018-06-05 ENCOUNTER — HOSPITAL ENCOUNTER (OUTPATIENT)
Dept: PREADMISSION TESTING | Age: 58
Discharge: HOME | End: 2018-06-05
Payer: COMMERCIAL

## 2018-06-05 VITALS — BODY MASS INDEX: 22.88 KG/M2 | WEIGHT: 151 LBS | HEIGHT: 68 IN

## 2018-06-05 LAB
ANION GAP SERPL CALC-SCNC: 8 MMOL/L (ref 3–18)
BUN SERPL-MCNC: 10 MG/DL (ref 7–18)
BUN/CREAT SERPL: 16 (ref 12–20)
CALCIUM SERPL-MCNC: 9 MG/DL (ref 8.5–10.1)
CHLORIDE SERPL-SCNC: 104 MMOL/L (ref 100–108)
CO2 SERPL-SCNC: 29 MMOL/L (ref 21–32)
CREAT SERPL-MCNC: 0.61 MG/DL (ref 0.6–1.3)
GLUCOSE SERPL-MCNC: 107 MG/DL (ref 74–99)
HBA1C MFR BLD: 6.4 % (ref 4.5–5.6)
HCT VFR BLD AUTO: 41.7 % (ref 36–48)
HGB BLD-MCNC: 13.7 G/DL (ref 13–16)
POTASSIUM SERPL-SCNC: 4.2 MMOL/L (ref 3.5–5.5)
SODIUM SERPL-SCNC: 141 MMOL/L (ref 136–145)

## 2018-06-05 PROCEDURE — 80048 BASIC METABOLIC PNL TOTAL CA: CPT | Performed by: UROLOGY

## 2018-06-05 PROCEDURE — 85018 HEMOGLOBIN: CPT | Performed by: UROLOGY

## 2018-06-05 PROCEDURE — 83036 HEMOGLOBIN GLYCOSYLATED A1C: CPT | Performed by: UROLOGY

## 2018-06-05 RX ORDER — SODIUM CHLORIDE, SODIUM LACTATE, POTASSIUM CHLORIDE, CALCIUM CHLORIDE 600; 310; 30; 20 MG/100ML; MG/100ML; MG/100ML; MG/100ML
125 INJECTION, SOLUTION INTRAVENOUS CONTINUOUS
Status: CANCELLED | OUTPATIENT
Start: 2018-06-05

## 2018-06-05 NOTE — PERIOP NOTES
Fasting blood sugar on admit denies sleep apnea or complications with anesthesia tramaine prep reviewed

## 2018-06-18 ENCOUNTER — ANESTHESIA EVENT (OUTPATIENT)
Dept: SURGERY | Age: 58
End: 2018-06-18
Payer: COMMERCIAL

## 2018-06-18 RX ORDER — FENTANYL CITRATE 50 UG/ML
50 INJECTION, SOLUTION INTRAMUSCULAR; INTRAVENOUS
Status: CANCELLED | OUTPATIENT
Start: 2018-06-18

## 2018-06-18 RX ORDER — INSULIN LISPRO 100 [IU]/ML
INJECTION, SOLUTION INTRAVENOUS; SUBCUTANEOUS ONCE
Status: CANCELLED | OUTPATIENT
Start: 2018-06-18 | End: 2018-06-19

## 2018-06-18 RX ORDER — MAGNESIUM SULFATE 100 %
4 CRYSTALS MISCELLANEOUS AS NEEDED
Status: CANCELLED | OUTPATIENT
Start: 2018-06-18

## 2018-06-18 RX ORDER — DEXTROSE 50 % IN WATER (D50W) INTRAVENOUS SYRINGE
25-50 AS NEEDED
Status: CANCELLED | OUTPATIENT
Start: 2018-06-18

## 2018-06-18 RX ORDER — SODIUM CHLORIDE, SODIUM LACTATE, POTASSIUM CHLORIDE, CALCIUM CHLORIDE 600; 310; 30; 20 MG/100ML; MG/100ML; MG/100ML; MG/100ML
1000 INJECTION, SOLUTION INTRAVENOUS CONTINUOUS
Status: CANCELLED | OUTPATIENT
Start: 2018-06-18

## 2018-06-18 RX ORDER — SODIUM CHLORIDE 0.9 % (FLUSH) 0.9 %
5-10 SYRINGE (ML) INJECTION AS NEEDED
Status: CANCELLED | OUTPATIENT
Start: 2018-06-18

## 2018-06-18 RX ORDER — FLUMAZENIL 0.1 MG/ML
0.2 INJECTION INTRAVENOUS
Status: CANCELLED | OUTPATIENT
Start: 2018-06-18

## 2018-06-18 RX ORDER — NALOXONE HYDROCHLORIDE 0.4 MG/ML
0.1 INJECTION, SOLUTION INTRAMUSCULAR; INTRAVENOUS; SUBCUTANEOUS AS NEEDED
Status: CANCELLED | OUTPATIENT
Start: 2018-06-18

## 2018-06-19 ENCOUNTER — ANESTHESIA (OUTPATIENT)
Dept: SURGERY | Age: 58
End: 2018-06-19
Payer: COMMERCIAL

## 2018-06-19 ENCOUNTER — HOSPITAL ENCOUNTER (OUTPATIENT)
Age: 58
Setting detail: OUTPATIENT SURGERY
Discharge: HOME | End: 2018-06-19
Attending: UROLOGY | Admitting: UROLOGY
Payer: COMMERCIAL

## 2018-06-19 ENCOUNTER — APPOINTMENT (OUTPATIENT)
Dept: GENERAL RADIOLOGY | Age: 58
End: 2018-06-19
Attending: UROLOGY
Payer: COMMERCIAL

## 2018-06-19 VITALS
HEIGHT: 67 IN | SYSTOLIC BLOOD PRESSURE: 124 MMHG | BODY MASS INDEX: 23.16 KG/M2 | DIASTOLIC BLOOD PRESSURE: 77 MMHG | HEART RATE: 55 BPM | WEIGHT: 147.56 LBS | RESPIRATION RATE: 16 BRPM | TEMPERATURE: 97.4 F | OXYGEN SATURATION: 100 %

## 2018-06-19 PROBLEM — Z96.0 RETAINED URETERAL STENT: Status: ACTIVE | Noted: 2018-06-19

## 2018-06-19 LAB
GLUCOSE BLD STRIP.AUTO-MCNC: 124 MG/DL (ref 70–110)
GLUCOSE BLD STRIP.AUTO-MCNC: 148 MG/DL (ref 70–110)

## 2018-06-19 PROCEDURE — C2617 STENT, NON-COR, TEM W/O DEL: HCPCS | Performed by: UROLOGY

## 2018-06-19 PROCEDURE — 77030018836 HC SOL IRR NACL ICUM -A: Performed by: UROLOGY

## 2018-06-19 PROCEDURE — 77030020782 HC GWN BAIR PAWS FLX 3M -B: Performed by: UROLOGY

## 2018-06-19 PROCEDURE — 76010000138 HC OR TIME 0.5 TO 1 HR: Performed by: UROLOGY

## 2018-06-19 PROCEDURE — 82962 GLUCOSE BLOOD TEST: CPT

## 2018-06-19 PROCEDURE — 74011000250 HC RX REV CODE- 250

## 2018-06-19 PROCEDURE — 74011250636 HC RX REV CODE- 250/636

## 2018-06-19 PROCEDURE — 76060000032 HC ANESTHESIA 0.5 TO 1 HR: Performed by: UROLOGY

## 2018-06-19 PROCEDURE — 76210000021 HC REC RM PH II 0.5 TO 1 HR: Performed by: UROLOGY

## 2018-06-19 PROCEDURE — C1769 GUIDE WIRE: HCPCS | Performed by: UROLOGY

## 2018-06-19 PROCEDURE — 77030019927 HC TBNG IRR CYSTO BAXT -A: Performed by: UROLOGY

## 2018-06-19 PROCEDURE — C1758 CATHETER, URETERAL: HCPCS | Performed by: UROLOGY

## 2018-06-19 PROCEDURE — 74011250636 HC RX REV CODE- 250/636: Performed by: UROLOGY

## 2018-06-19 PROCEDURE — 76210000016 HC OR PH I REC 1 TO 1.5 HR: Performed by: UROLOGY

## 2018-06-19 DEVICE — VARIABLE LENGTH URETERAL STENT
Type: IMPLANTABLE DEVICE | Site: URETER | Status: FUNCTIONAL
Brand: CONTOUR VL™

## 2018-06-19 RX ORDER — ONDANSETRON 2 MG/ML
INJECTION INTRAMUSCULAR; INTRAVENOUS AS NEEDED
Status: DISCONTINUED | OUTPATIENT
Start: 2018-06-19 | End: 2018-06-19 | Stop reason: HOSPADM

## 2018-06-19 RX ORDER — LIDOCAINE HYDROCHLORIDE 20 MG/ML
INJECTION, SOLUTION EPIDURAL; INFILTRATION; INTRACAUDAL; PERINEURAL AS NEEDED
Status: DISCONTINUED | OUTPATIENT
Start: 2018-06-19 | End: 2018-06-19 | Stop reason: HOSPADM

## 2018-06-19 RX ORDER — FENTANYL CITRATE 50 UG/ML
INJECTION, SOLUTION INTRAMUSCULAR; INTRAVENOUS AS NEEDED
Status: DISCONTINUED | OUTPATIENT
Start: 2018-06-19 | End: 2018-06-19 | Stop reason: HOSPADM

## 2018-06-19 RX ORDER — DEXAMETHASONE SODIUM PHOSPHATE 4 MG/ML
INJECTION, SOLUTION INTRA-ARTICULAR; INTRALESIONAL; INTRAMUSCULAR; INTRAVENOUS; SOFT TISSUE AS NEEDED
Status: DISCONTINUED | OUTPATIENT
Start: 2018-06-19 | End: 2018-06-19 | Stop reason: HOSPADM

## 2018-06-19 RX ORDER — MIDAZOLAM HYDROCHLORIDE 1 MG/ML
INJECTION, SOLUTION INTRAMUSCULAR; INTRAVENOUS AS NEEDED
Status: DISCONTINUED | OUTPATIENT
Start: 2018-06-19 | End: 2018-06-19 | Stop reason: HOSPADM

## 2018-06-19 RX ORDER — CEFAZOLIN SODIUM 1 G/3ML
INJECTION, POWDER, FOR SOLUTION INTRAMUSCULAR; INTRAVENOUS AS NEEDED
Status: DISCONTINUED | OUTPATIENT
Start: 2018-06-19 | End: 2018-06-19 | Stop reason: HOSPADM

## 2018-06-19 RX ORDER — SODIUM CHLORIDE, SODIUM LACTATE, POTASSIUM CHLORIDE, CALCIUM CHLORIDE 600; 310; 30; 20 MG/100ML; MG/100ML; MG/100ML; MG/100ML
125 INJECTION, SOLUTION INTRAVENOUS CONTINUOUS
Status: DISCONTINUED | OUTPATIENT
Start: 2018-06-19 | End: 2018-06-19 | Stop reason: HOSPADM

## 2018-06-19 RX ORDER — PROPOFOL 10 MG/ML
INJECTION, EMULSION INTRAVENOUS AS NEEDED
Status: DISCONTINUED | OUTPATIENT
Start: 2018-06-19 | End: 2018-06-19 | Stop reason: HOSPADM

## 2018-06-19 RX ADMIN — CEFAZOLIN SODIUM 2 G: 1 INJECTION, POWDER, FOR SOLUTION INTRAMUSCULAR; INTRAVENOUS at 11:10

## 2018-06-19 RX ADMIN — DEXAMETHASONE SODIUM PHOSPHATE 4 MG: 4 INJECTION, SOLUTION INTRA-ARTICULAR; INTRALESIONAL; INTRAMUSCULAR; INTRAVENOUS; SOFT TISSUE at 11:10

## 2018-06-19 RX ADMIN — ONDANSETRON 4 MG: 2 INJECTION INTRAMUSCULAR; INTRAVENOUS at 11:10

## 2018-06-19 RX ADMIN — MIDAZOLAM HYDROCHLORIDE 2 MG: 1 INJECTION, SOLUTION INTRAMUSCULAR; INTRAVENOUS at 10:58

## 2018-06-19 RX ADMIN — LIDOCAINE HYDROCHLORIDE 80 MG: 20 INJECTION, SOLUTION EPIDURAL; INFILTRATION; INTRACAUDAL; PERINEURAL at 11:04

## 2018-06-19 RX ADMIN — FENTANYL CITRATE 25 MCG: 50 INJECTION, SOLUTION INTRAMUSCULAR; INTRAVENOUS at 11:10

## 2018-06-19 RX ADMIN — PROPOFOL 150 MG: 10 INJECTION, EMULSION INTRAVENOUS at 11:04

## 2018-06-19 RX ADMIN — SODIUM CHLORIDE, SODIUM LACTATE, POTASSIUM CHLORIDE, AND CALCIUM CHLORIDE 125 ML/HR: 600; 310; 30; 20 INJECTION, SOLUTION INTRAVENOUS at 09:49

## 2018-06-19 NOTE — BRIEF OP NOTE
BRIEF OPERATIVE NOTE    Date of Procedure: 6/19/2018   Preoperative Diagnosis: RETAINED URETERAL STENT  Postoperative Diagnosis: SAME    Procedure(s):  CYSTOSCOPY, RIGHT URETERSCOPY WITH URETEROSCOPIC REMOVAL OF STENT    Surgeon(s) and Role:     * Fausto Ortiz MD - Primary         Surgical Assistant: NONE    Surgical Staff:  Circ-1: Macy Velasquez  Scrub Tech-1: Saundra Gray  Scrub Tech-2: Jefferson Rendon  No case tracking events are documented in the log. Anesthesia: Other   Estimated Blood Loss: NONE  Specimens: * No specimens in log *   Findings: STENT COILD IN DISTAL URETER WAS EASILY GRASPED.   A NEW STENT ON A STRING WAS PLACED   Complications: NONE  Implants: 4.8 FR VL STENT ON  A STRING

## 2018-06-19 NOTE — PERIOP NOTES
TRANSFER - OUT REPORT:    Verbal report given to [facility], RN(name) on Kira Richardson  being transferred to phase 2(unit) for routine post - op       Report consisted of patients Situation, Background, Assessment and   Recommendations(SBAR). Information from the following report(s) SBAR, Kardex, OR Summary, Procedure Summary, Intake/Output and MAR was reviewed with the receiving nurse. Lines:   Peripheral IV 06/19/18 Left Forearm (Active)   Site Assessment Clean, dry, & intact 6/19/2018 11:11 AM   Phlebitis Assessment 0 6/19/2018 11:11 AM   Infiltration Assessment 0 6/19/2018 11:11 AM   Dressing Status Clean, dry, & intact 6/19/2018 11:11 AM   Dressing Type Transparent 6/19/2018 11:11 AM   Hub Color/Line Status Patent 6/19/2018 11:11 AM        Opportunity for questions and clarification was provided.       Patient transported with:   tech

## 2018-06-19 NOTE — ANESTHESIA PREPROCEDURE EVALUATION
Anesthetic History   No history of anesthetic complications            Review of Systems / Medical History  Patient summary reviewed, nursing notes reviewed and pertinent labs reviewed    Pulmonary  Within defined limits                 Neuro/Psych   Within defined limits           Cardiovascular  Within defined limits                Exercise tolerance: >4 METS     GI/Hepatic/Renal  Within defined limits              Endo/Other    Diabetes: well controlled, type 2      Pertinent negatives: No hypothyroidism and hyperthyroidism   Other Findings              Physical Exam    Airway  Mallampati: II  TM Distance: 4 - 6 cm  Neck ROM: normal range of motion   Mouth opening: Normal     Cardiovascular    Rhythm: regular  Rate: normal         Dental  No notable dental hx       Pulmonary  Breath sounds clear to auscultation               Abdominal  GI exam deferred       Other Findings            Anesthetic Plan    ASA: 2  Anesthesia type: general          Induction: Intravenous  Anesthetic plan and risks discussed with: Patient

## 2018-06-19 NOTE — ANESTHESIA POSTPROCEDURE EVALUATION
Post-Anesthesia Evaluation & Assessment    Visit Vitals    /77    Pulse (!) 55    Temp 36.3 °C (97.4 °F)    Resp 16    Ht 5' 7\" (1.702 m)    Wt 66.9 kg (147 lb 9 oz)    SpO2 100%    BMI 23.11 kg/m2       No untreated/active PONV    Post-operative hydration adequate. Adequate post-operative analgesia per PACU discharge criteria    Mental status & level of consciousness: alert and oriented x 3    Respiratory status: patent unassisted airway     No apparent anesthetic complications requiring additional post-anesthetic care    Patient has met all discharge requirements.             Diana Rodriguez MD

## 2018-06-19 NOTE — DISCHARGE INSTRUCTIONS
Drink plenty of fluids to keep the urine clear  Regular diet  Continue home medications  Shower tomorrow  Dr. Marybeth Duke office will call with follow up appointment  Contact Dr. Marybeth Duke office with any Post op questions or concerns at [address] from Nurse    PATIENT INSTRUCTIONS:    After general anesthesia or intravenous sedation, for 24 hours or while taking prescription Narcotics:  · Limit your activities  · Do not drive and operate hazardous machinery  · Do not make important personal or business decisions  · Do  not drink alcoholic beverages  · If you have not urinated within 8 hours after discharge, please contact your surgeon on call. Report the following to your surgeon:  · Excessive pain, swelling, redness or odor of or around the surgical area  · Temperature over 100.5  · Nausea and vomiting lasting longer than 4 hours or if unable to take medications  · Any signs of decreased circulation or nerve impairment to extremity: change in color, persistent  numbness, tingling, coldness or increase pain  · Any questions    What to do at Home:  Recommended activity: Ambulate in house,     If you experience any of the following symptoms fever, chills, uncontrollable pain , active bleeding , difficulty urinating, please follow up with Dr. Theresa Cameron. *  Please give a list of your current medications to your Primary Care Provider. *  Please update this list whenever your medications are discontinued, doses are      changed, or new medications (including over-the-counter products) are added. *  Please carry medication information at all times in case of emergency situations. These are general instructions for a healthy lifestyle:    No smoking/ No tobacco products/ Avoid exposure to second hand smoke  Surgeon General's Warning:  Quitting smoking now greatly reduces serious risk to your health.     Obesity, smoking, and sedentary lifestyle greatly increases your risk for illness    A healthy diet, regular physical exercise & weight monitoring are important for maintaining a healthy lifestyle    You may be retaining fluid if you have a history of heart failure or if you experience any of the following symptoms:  Weight gain of 3 pounds or more overnight or 5 pounds in a week, increased swelling in our hands or feet or shortness of breath while lying flat in bed. Please call your doctor as soon as you notice any of these symptoms; do not wait until your next office visit. Recognize signs and symptoms of STROKE:    F-face looks uneven    A-arms unable to move or move unevenly    S-speech slurred or non-existent    T-time-call 911 as soon as signs and symptoms begin-DO NOT go       Back to bed or wait to see if you get better-TIME IS BRAIN. Warning Signs of HEART ATTACK     Call 911 if you have these symptoms:   Chest discomfort. Most heart attacks involve discomfort in the center of the chest that lasts more than a few minutes, or that goes away and comes back. It can feel like uncomfortable pressure, squeezing, fullness, or pain.  Discomfort in other areas of the upper body. Symptoms can include pain or discomfort in one or both arms, the back, neck, jaw, or stomach.  Shortness of breath with or without chest discomfort.  Other signs may include breaking out in a cold sweat, nausea, or lightheadedness. Don't wait more than five minutes to call 911 - MINUTES MATTER! Fast action can save your life. Calling 911 is almost always the fastest way to get lifesaving treatment. Emergency Medical Services staff can begin treatment when they arrive -- up to an hour sooner than if someone gets to the hospital by car. Patient armband removed and shredded    The discharge information has been reviewed with the patient and caregiver. The patient and caregiver verbalized understanding.   Discharge medications reviewed with the patient and caregiver and appropriate educational materials and side effects teaching were provided.   ___________________________________________________________________________________________________________________________________

## 2018-06-19 NOTE — PERIOP NOTES
Reviewed PTA medication list with patient/caregiver and patient/caregiver denies any additional medications. Patient admits to having a responsible adult care for them for at least 24 hours after surgery.     Dual skin assessment completed by Abby YOUNGER and Pranav Ag RN.

## 2018-06-19 NOTE — IP AVS SNAPSHOT
Nanciesajan Novak 
 
 
 [address] [place] [address] 
[phone] Patient: Ankush Nicolas MRN: [id number] PXW:[id number] About your hospitalization You were admitted on:  June 19, 2018 You last received care in the:  [facility] PHASE 2 RECOVERY You were discharged on:  June 19, 2018 Why you were hospitalized Your primary diagnosis was:  Retained Ureteral Stent Follow-up Information Follow up With Details Comments Contact Info Florina Jones [address] [facility] 
[phone] She Zuniga MD  office will call with follow up appointment [address] [facility] 107 [address] [facility] 
[phone] Discharge Orders None A check christina indicates which time of day the medication should be taken. My Medications CONTINUE taking these medications Instructions Each Dose to Equal  
 Morning Noon Evening Bedtime  
 metFORMIN 1,000 mg tablet Commonly known as:  GLUCOPHAGE Your last dose was: Your next dose is: Take 1,000 mg by mouth two (2) times daily (with meals). 1000 mg  
    
   
   
   
  
 simvastatin 20 mg tablet Commonly known as:  ZOCOR Your last dose was: Your next dose is: Take 20 mg by mouth nightly. 20 mg  
    
   
   
   
  
 tamsulosin 0.4 mg capsule Commonly known as:  FLOMAX Your last dose was: Your next dose is: Take 0.4 mg by mouth daily. 0.4 mg Discharge Instructions Drink plenty of fluids to keep the urine clear Regular diet Continue home medications Shower tomorrow Dr. Norberto Devine office will call with follow up appointment Contact Dr. Norberto Devine office with any Post op questions or concerns at [phone] DISCHARGE SUMMARY from Nurse PATIENT INSTRUCTIONS: 
 
 
F-face looks uneven A-arms unable to move or move unevenly S-speech slurred or non-existent T-time-call 911 as soon as signs and symptoms begin-DO NOT go Back to bed or wait to see if you get better-TIME IS BRAIN. Warning Signs of HEART ATTACK Call 911 if you have these symptoms: 
? Chest discomfort. Most heart attacks involve discomfort in the center of the chest that lasts more than a few minutes, or that goes away and comes back. It can feel like uncomfortable pressure, squeezing, fullness, or pain. ? Discomfort in other areas of the upper body. Symptoms can include pain or discomfort in one or both arms, the back, neck, jaw, or stomach. ? Shortness of breath with or without chest discomfort. ? Other signs may include breaking out in a cold sweat, nausea, or lightheadedness. Don't wait more than five minutes to call [address]! Fast action can save your life. Calling 911 is almost always the fastest way to get lifesaving treatment. Emergency Medical Services staff can begin treatment when they arrive  up to an hour sooner than if someone gets to the hospital by car. Patient armband removed and shredded The discharge information has been reviewed with the patient and caregiver. The patient and caregiver verbalized understanding. Discharge medications reviewed with the patient and caregiver and appropriate educational materials and side effects teaching were provided. ___________________________________________________________________________________________________________________________________ Introducing [facility]!    
 Prudencio Luna introduces [employer] patient portal. Now you can access parts of your medical record, email your doctor's office, and request medication refills online. 1. In your internet browser, go to [email or web address] 2. Click on the First Time User? Click Here link in the Sign In box. You will see the New Member Sign Up page. 3. Enter your FlipKey Access Code exactly as it appears below. You will not need to use this code after youve completed the sign-up process. If you do not sign up before the expiration date, you must request a new code. · FlipKey Access Code: [email or web address] Expires: 8/6/2018 12:33 PM 
 
4. Enter the last four digits of your Social Security Number (xxxx) and Date of Birth (mm/dd/yyyy) as indicated and click Submit. You will be taken to the next sign-up page. 5. Create a FlipKey ID. This will be your FlipKey login ID and cannot be changed, so think of one that is secure and easy to remember. 6. Create a FlipKey password. You can change your password at any time. 7. Enter your Password Reset Question and Answer. This can be used at a later time if you forget your password. 8. Enter your e-mail address. You will receive e-mail notification when new information is available in [address] 9. Click Sign Up. You can now view and download portions of your medical record. 10. Click the Download Summary menu link to download a portable copy of your medical information. If you have questions, please visit the Frequently Asked Questions section of the FlipKey website. Remember, FlipKey is NOT to be used for urgent needs. For medical emergencies, dial 911. Now available from your iPhone and Android! Introducing Vishal Arevalo As a [facility] patient, I wanted to make you aware of our electronic visit tool called Vishal Arevalo. [facility] 24/7 allows you to connect within minutes with a medical provider 24 hours a day, seven days a week via a mobile device or tablet or logging into a secure website from your computer. You can access Vishal Godoyfin from anywhere in the [place]. A virtual visit might be right for you when you have a simple condition and feel like you just dont want to get out of bed, or cant get away from work for an appointment, when your regular Marlyn Bynum provider is not available (evenings, weekends or holidays), or when youre out of town and need minor care. Electronic visits cost only $49 and if the Marlyn Bynum 24/7 provider determines a prescription is needed to treat your condition, one can be electronically transmitted to a nearby pharmacy*. Please take a moment to enroll today if you have not already done so. The enrollment process is free and takes just a few minutes. To enroll, please download the Marlyn Bynum [employer] elizabeth to your tablet or phone, or visit [email or web address] to enroll on your computer. And, as an [address] [place] [address] patient with a 3Touch account, the results of your visits will be scanned into your electronic medical record and your primary care provider will be able to view the scanned results. We urge you to continue to see your regular Marlyn Bynum provider for your ongoing medical care. And while your primary care provider may not be the one available when you seek a Vishal Justinsamirfin virtual visit, the peace of mind you get from getting a real diagnosis real time can be priceless. For more information on Vishal Justinsamirfin, view our Frequently Asked Questions (FAQs) at [email or web address]. Sincerely, 
 
Alfred Almaraz MD 
Chief Medical Officer [address] [place] [address] *:  certain medications cannot be prescribed via Vishal Waltfin Unresulted tests-please follow up with your PCP on these results Procedure/Test Authorizing Provider [place] XR TECHNOLOGIST Dori Flowers MD  
  
Providers Seen During Your Hospitalization Provider Specialty Primary office phone Ronen Lui MD Urology [phone] Your Primary Care Physician (PCP) Primary Care Physician Office Phone Office Fax Blanca Hawkins [phone] You are allergic to the following Allergen Reactions Other Food Hives Canned meat Recent Documentation Height Weight BMI Smoking Status 1.702 m 66.9 kg 23.11 kg/m2 Former Smoker Emergency Contacts Name Discharge Info Relation Home Work Mobile ANNA CLIFFORD [facility] DISCHARGE CAREGIVER [3] Spouse [3]   [phone] Kyung Tellez N/A  AT THIS TIME [6] Other Relative [6]   [phone] [facility] DISCHARGE CAREGIVER [3] Son [22]   [phone] Patient Belongings The following personal items are in your possession at time of discharge: 
  Dental Appliances: None  Visual Aid: Glasses, With patient      Home Medications: None      Clothing: Footwear, Belt, Shirt, Shorts, Socks, Undergarments (3)    Other Valuables: Eyeglasses (given to spouse) Please provide this summary of care documentation to your next provider. Signatures-by signing, you are acknowledging that this After Visit Summary has been reviewed with you and you have received a copy. Patient Signature:  ____________________________________________________________ Date:  ____________________________________________________________  
  
Heath Gregory Provider Signature:  ____________________________________________________________ Date:  ____________________________________________________________

## 2018-06-19 NOTE — H&P
Patient: Chantal Casanova  YOB: 1960        Assessment/Plan  # Detail Type Description    1. Assessment Ureteric stone (N20.1). Patient Plan He is now stone free. Plan Orders Further diagnostic evaluations ordered today include(s) Abdominal/KUB 1 View to be performed. 2. Assessment Retained ureteral stent (Z96.0). Patient Plan We will do a cysto and right ureteroscopy and removal of the stent. risks of bleeding, infection, injury, and possible need for placement of a new stent were discussed. This 59year old  patient was referred by Bree Cabrera. This 59year old male presents for Kidney and/or Ureteral Stone. History of Present Illness:  1. Kidney and/or Ureteral Stone      Onset was 1 month ago. Severity level is mild. It occurs intermittently. The problem is improving. The pain does not radiate. Prior stone type of calcium oxalate monohydrate. There are no aggravating factors. The patient lists no relieving factors. Associated symptoms include hematuria (gross) and pain. Pertinent negatives include chills, constipation, diarrhea, fever, nausea, pelvic pain and vomiting. Additional information: 5/1/18 KUB: 4x11 stone at right UPJ     5/23/18:  He is feeling fine. No pain or fevers. no hematuria. No dysura or frequency. However on cysto I was unable to remove the stent. .        Performed Test Interpretation Result   05/23/2018 Abdominal/KUB 1 View See module              PAST MEDICAL/SURGICAL HISTORY   (Detailed)      DIAGNOSTICS HISTORY:  Test Ordered Interpretation Result completed   EKG 08/05/2014 See Result Normal sinus rhythm. No abnormal findings noted. Rhythm: sinus.  Rate: 61. AV Conduction: normal. QRS Axis and Voltage: normal. Chamber Hypertrophy/Enlargement: normal. ST/T wave abnormality: normal. 08/05/2014   *EYE EXAM & TREATMENT  No DM retinopathy  02/12/2018     Test Ordered Ordering Comments Modifier   EKG 08/05/2014     *EYE EXAM & TREATMENT          PROBLEM LIST:  Problem Description Onset Date   Pinguecula 2012   Disorder of refraction AND/OR accommodation 2012   Hematuria, gross    Type II diabetes mellitus w/o complication    Pure hypercholesterolemia 2012   Hypercholesterolemia 2015   Type 2 diabetes mellitus well controlled 2015     Patient Status   Completed with information received for patient transitioning into care. Medication Reconciliation  Medications reconciled today. Medication Reviewed  Adherence Medication Name Sig Desc Elsewhere Status   taking as directed metformin  mg tablet,extended release 24hr take 2 tablet by oral route 2 times every day with the morning and evening meal N Verified   taking as directed Flomax 0.4 mg capsule take 1 capsule by oral route  every day 1/2 hour following the same meal each day N Verified   taking as directed Zocor 20 mg tablet take 1 tablet by oral route  every day in the evening N Verified     Allergies  Ingredient Reaction Medication Name Comment   NO KNOWN ALLERGIES        (Reviewed, no changes.)  Family History:  (Detailed)  Relationship Family Member Name  Age at Death Condition Onset Age Cause of Death   Brother    Diabetes mellitus type 2 58 N   Mother    DM, Chol  N   Sister    DM 59 N         Social History:  (Detailed)  Tobacco use reviewed. The patient is right-handed. Preferred language is Fresh !. EDUCATION/EMPLOYMENT/OCCUPATION  Employment History Status Retired Restrictions   [employer]        MARITAL STATUS/FAMILY/SOCIAL SUPPORT  Currently . CHILDREN  Has children:  2 son(s). Tobacco use status: Never smoked tobacco.  Smoking status: Never smoker. SMOKING STATUS  Use Status Type Smoking Status Usage Per Day Years Used Total Pack Years   no/never  Never smoker          No passive smoke exposure. ALCOHOL  There is a history of alcohol use. Type: Naqvi Vodka.  4 drinks consumed weekly. Last alcoholic drink was last week. CAFFEINE  The patient uses caffeine: coffee. LIFESTYLE  Moderate activity level. Exercises daily. DIET  , high cholesterol. SLEEP PATTERNS  Patient has no changes to sleep patterns. HOME ENVIRONMENT/SAFETY  The home has smoke detectors. Uses seat belts. Review of Systems  System Neg/Pos Details   Constitutional Positive Pain. Constitutional Negative Chills and fever. ENMT Negative Ear infections and sore throat. Eyes Negative Blurred vision, double vision and eye pain. Respiratory Negative Asthma, chronic cough, dyspnea and wheezing. Cardio Negative Chest pain. GI Negative Constipation, decreased appetite, diarrhea, nausea and vomiting.  Positive Hematuria.  Negative Pelvic pain. Endocrine Negative Cold intolerance, heat intolerance, increased thirst and weight loss. Neuro Negative Headache and tremors. Psych Negative Anxiety and depression. Integumentary Negative Itching skin and rash. MS Negative Back pain and joint pain. Hema/Lymph Negative Easy bleeding. Reproductive Negative Sexual dysfunction. Vital Signs     Height  Time ft in cm Last Measured Height Position   8:08 AM 5.0 8.00 172.72 10/20/2017 Standing     Weight/BSA/BMI  Time lb oz kg Context BMI kg/m2 BSA m2   8:08 .00  68.039  22.81      Measured By  Time Measured by   8:08 AM Lebron Vergara       Physical Exam  Exam Findings Details   Constitutional Normal Well developed. Neck Exam Normal Inspection - Normal.   Respiratory Normal Inspection - Normal.   Abdomen Normal No abdominal tenderness. Genitourinary Normal Penis - Normal. Urethral meatus - Normal. No CVA tenderness. No suprapubic tenderness. Extremity Normal No edema. Psychiatric Normal Oriented to time, place, person and situation. Appropriate mood and affect. Procedures:  Cystoscopy indication:  Kidney. Patient consent:  Consent was obtained.  The procedure and risks were explained in detail. Questions were encouraged and answered. The patient was prepped and draped in the usual sterile fashion. Procedure:  A diagnostic cystourethroscopy was performed using a 16 [place] of [place] flexible cystoscope  Anesthesia:  Lidocaine Jelly 2%  Patient position:  Supine. Patient response:  Patient tolerated procedure well. Patient was given instructions. Patient was discharged in stable condition. Findings:  Anterior urethra normal in appearance. Prostatic urethra normal in appearance. Ureteral orifices retained  ureteral stent. Antibiotics:  Antibiotics given:  Cipro  Impression:  Presence of urogenital implants Z96.0.       Medications (added, continued, or stopped today)  Started Medication Directions PRN Status PRN Reason Instruction Stopped   11/03/2017 Flomax 0.4 mg capsule take 1 capsule by oral route  every day 1/2 hour following the same meal each day N      04/12/2018 metformin  mg tablet,extended release 24hr take 2 tablet by oral route 2 times every day with the morning and evening meal N      04/12/2018 Zocor 20 mg tablet take 1 tablet by oral route  every day in the evening N      Completed by:            Kortney Matias MD

## 2018-07-03 NOTE — OP NOTES
[address] REPORT    Ayo Steinberg  MR#: [id number]  : 1960  ACCOUNT #: [de-identified]   DATE OF SERVICE: 2018    PREOPERATIVE DIAGNOSIS:  Retained ureteral stent. POSTOPERATIVE DIAGNOSIS:  Retained ureteral stent. PROCEDURE PERFORMED:  Cystoscopy, right ureteroscopy with ureteroscopic removal of stent. SURGEON:  Monroe Christiansen MD    ASSISTANT:  None. ANESTHESIA:  General.    ESTIMATED BLOOD LOSS:  None. SPECIMENS REMOVED:  None. FINDINGS:  The patient was found to have a stent coiled in the distal ureter. It was easily grasped and removed. A new stent on a string was placed. COMPLICATIONS:  None. IMPLANT:  A 4.8 [place] VL stent on a string. CLINICAL NOTE:  The patient is a 49-year-old gentleman with a history of stones who had a ureteroscopy with stent placement. He is now stone free, but he went in for stent removal and the stent had migrated into the distal ureter. He presents for ureteroscopy and stent removal.    OPERATIVE REPORT:  Preoperatively, risks and benefits of the surgery were described to the patient with the risks including but not limited to bleeding, infection, injury to the bladder, injury to the ureter, injury to the kidney, possible need for future procedures. The patient assumed the risks and signed informed consent. The patient was taken to the operating room, placed on the OR table in supine position. He was administered general anesthetic. He was administered intravenous antibiotics. He was placed in dorsal lithotomy position, prepped and draped in the usual sterile manner. A 21-[place] cystoscope and sheath was then inserted transurethrally atraumatically under direct vision using a 30 degree lens. Panendoscopy was done. Bilateral ureteral orifices were in normal anatomic position. There were no stones, no tumors, no areas of concern within the bladder.   The anterior urethra was normal.  The prostatic urethra showed only mild enlargement. Next, I passed an open ended catheter into the right ureteral orifice. I did a retrograde pyelogram with 50 mL Visipaque dye. The stent was identified. It was coiled just proximal to the UO. I passed a Sensor wire through the open-ended catheter up to the kidney. The open-ended catheter was removed. The scope was removed. I then passed a semi-rigid ureteroscope transurethrally atraumatically under direct vision into the bladder. I went up into the ureter and immediately saw the stent. Using a Graspit basket, I then grabbed the stent and removed it. I reinserted the cystoscope. Over the Safety wire, I passed a 4.8 [place] variable length stent with a string on. The wire was removed. Fluoroscopy confirmed the proximal coil in the kidney, the distal coil was noted to be in the bladder by direct vision. At this point, the scope was removed. The patient was then taken out of lithotomy position. The string was secured to the penis. The patient was then revived from anesthesia and taken to recovery in stable condition.       Yoon Mo MD       [facility] - [facility] / [place]  D: 07/03/2018 14:39     T: 07/03/2018 15:16  JOB #: [id number]

## (undated) DEVICE — SEAL ENDOSCP INSTR 7FR BX SELF SEAL

## (undated) DEVICE — NITINOL STONE RETRIEVAL BASKET: Brand: ZERO TIP

## (undated) DEVICE — SOLUTION IRRIG 3000ML 0.9% SOD CHL FLX CONT 0797208] ICU MEDICAL INC]

## (undated) DEVICE — SYR LR LCK 1ML GRAD NSAF 30ML --

## (undated) DEVICE — CATHETER URET L70CM OD6FR OPN END FLEXIMA

## (undated) DEVICE — GDWIRE 3CM FLX-TIP 0.038X150CM -- BX/5 SENSOR

## (undated) DEVICE — SPONGE GZ W4XL4IN COT 12 PLY TYP VII WVN C FLD DSGN

## (undated) DEVICE — 100% SILICONE FOLEY CATHETER,5-10 ML, 2-WAY: Brand: DOVER

## (undated) DEVICE — URETERAL ACCESS SHEATH SET: Brand: NAVIGATOR HD

## (undated) DEVICE — STERILE MEDICINE CUP 2 OZ KIT: Brand: CARDINAL HEALTH

## (undated) DEVICE — TRAP MUCUS SPECIMEN 40ML -- MEDICHOICE

## (undated) DEVICE — Y-TYPE TUR/BLADDER IRRIGATION SET, REGULATING CLAMP

## (undated) DEVICE — CYSTO PACK: Brand: MEDLINE INDUSTRIES, INC.

## (undated) DEVICE — UTILITY MARKER,BLACK WITH LABELS: Brand: DEVON

## (undated) DEVICE — DUAL LUMEN URETERAL CATHETER

## (undated) DEVICE — DRAPE XR C ARM 41X74IN LF --

## (undated) DEVICE — DRAPE TWL SURG 16X26IN BLU ORB04] ALLCARE INC]

## (undated) DEVICE — DEVON™ KNEE AND BODY STRAP 60" X 3" (1.5 M X 7.6 CM): Brand: DEVON

## (undated) DEVICE — INFLATION DEVICE: Brand: ENCORE 26

## (undated) DEVICE — STERILE LATEX POWDER-FREE SURGICAL GLOVESWITH NITRILE COATING: Brand: PROTEXIS

## (undated) DEVICE — TRAY PREP DRY W/ PREM GLV 2 APPL 6 SPNG 2 UNDPD 1 OVERWRAP

## (undated) DEVICE — (D)SYR 10ML 1/5ML GRAD NSAF -- PKGING CHANGE USE ITEM 338027